# Patient Record
Sex: MALE | Race: WHITE
[De-identification: names, ages, dates, MRNs, and addresses within clinical notes are randomized per-mention and may not be internally consistent; named-entity substitution may affect disease eponyms.]

---

## 2017-09-28 ENCOUNTER — HOSPITAL ENCOUNTER (EMERGENCY)
Dept: HOSPITAL 31 - C.ER | Age: 72
Discharge: HOME | End: 2017-09-28
Payer: MEDICARE

## 2017-09-28 VITALS
DIASTOLIC BLOOD PRESSURE: 83 MMHG | SYSTOLIC BLOOD PRESSURE: 142 MMHG | RESPIRATION RATE: 18 BRPM | TEMPERATURE: 97.9 F | OXYGEN SATURATION: 99 % | HEART RATE: 88 BPM

## 2017-09-28 VITALS — BODY MASS INDEX: 27.4 KG/M2

## 2017-09-28 DIAGNOSIS — M54.30: Primary | ICD-10-CM

## 2017-09-28 NOTE — C.PDOC
History Of Present Illness


72 year old male with a Hx of sciatica presents to the ER with a complaint of 

sudden onset of back pain radiating down the bilateral thighs that began one 

hour PTA. Patient states the pain felt like cramps/spasms and he notes that he 

called EMS because he felt his legs were too stiff to ambulate. Patient reports 

his pain resolved upon arrival and is requesting to leave; denies weakness, 

numbness, dysuria, hematuria, or incontinence. 


Time Seen by Provider: 09/28/17 22:01


Chief Complaint (Nursing): Back Pain


History Per: Patient


History/Exam Limitations: no limitations


Onset/Duration Of Symptoms: Hrs, Sudden Onset


Current Symptoms Are (Timing): Gone


Quality Of Discomfort: Cramping, Other (Spasm)


Previous Symptoms: Other (Sciatica)


Associated Symptoms: None


Exacerbating Factor(s): Nothing


Recent travel outside of the [place]: No





Past Medical History


Reviewed: Historical Data, Nursing Documentation, Vital Signs


Vital Signs: 


 Last Vital Signs











Temp  97.9 F   09/28/17 21:51


 


Pulse  88   09/28/17 21:51


 


Resp  18   09/28/17 21:51


 


BP  142/83   09/28/17 21:51


 


Pulse Ox  99   09/29/17 03:07














- Medical History


PMH: Anemia, HTN





- [facility] Procedures








CYSTOMETROGRAM (07/07/15)


CYSTOSCOPY NEC (07/24/15)


DX ULTRASOUND NEC (07/07/15)


INSERT INDWELLING CATH (09/17/15)


PERCUTAN NEEDLE BIOPSY OF PROSTATE (07/07/15)


TRANSURETHRAL PROSTATECTOMY (TULIP) (07/24/15)








Family History: States: Unknown Family Hx





- Social History


Hx Alcohol Use: Yes (Occasional)


Hx Substance Use: No





- Immunization History


Hx Tetanus Toxoid Vaccination: No


Hx Influenza Vaccination: No


Hx Pneumococcal Vaccination: No





Review Of Systems


Constitutional: Negative for: Fever, Chills


Genitourinary: Negative for: Dysuria, Incontinence, Hematuria


Musculoskeletal: Positive for: Back Pain, Leg Pain


Neurological: Negative for: Weakness, Numbness





Physical Exam





- Physical Exam


Appears: Non-toxic, No Acute Distress


Skin: Normal Color, Warm, Dry


Head: Atraumatic, Normacephalic


Oral Mucosa: Moist


Back: No CVA Tenderness, No Vertebral Tenderness, No Paraspinal Tenderness


Extremity: Normal ROM (x4), No Tenderness, No Pedal Edema, No Deformity


Neurological/Psych: Oriented x3, Normal Speech, Normal Cognition, Normal Motor, 

Normal Sensation, Other (No focal deficits)


Gait: Steady





ED Course And Treatment


O2 Sat by Pulse Oximetry: 99 (Room air)


Pulse Ox Interpretation: Normal


Progress Note: Patient is resting comfortably, and is in no acute distress. 

Patient was instructed to follow up with PMD in 1-2 days for further 

evaluation. Patient requested Rx for muscle relaxer; Rx given and patient 

discharged home.





Disposition


Counseled Patient/Family Regarding: Diagnosis, Need For Followup, Rx Given





- Disposition


Referrals: 


Jaky Andres MD [Staff Provider] - 


Disposition: HOME/ ROUTINE


Disposition Time: 22:33


Condition: STABLE


Additional Instructions: 


Please follow up with PMD 





Continue Aleve as needed for pain





Take flexeril only at night





Return to ER if worse 


Prescriptions: 


Cyclobenzaprine [Cyclobenzaprine HCl] 10 mg PO HS #7 tab


Instructions:  Sciatica (ED)


Forms:  [facility] (English)


Print Language: [place] citizen





- Clinical Impression


Clinical Impression: 


 Sciatica








- Scribe Statement


The provider has reviewed the documentation as recorded by the Scribori Saab





All medical record entries made by the Tuibe were at my direction and 

personally dictated by me. I have reviewed the chart and agree that the record 

accurately reflects my personal performance of the history, physical exam, 

medical decision making, and the department course for this patient. I have 

also personally directed, reviewed, and agree with the discharge instructions 

and disposition.

## 2019-02-03 ENCOUNTER — HOSPITAL ENCOUNTER (EMERGENCY)
Dept: HOSPITAL 31 - C.ER | Age: 74
LOS: 1 days | Discharge: HOME | End: 2019-02-04
Payer: COMMERCIAL

## 2019-02-03 VITALS — BODY MASS INDEX: 27.4 KG/M2

## 2019-02-03 DIAGNOSIS — R33.9: Primary | ICD-10-CM

## 2019-02-03 LAB
BACTERIA #/AREA URNS HPF: (no result) /[HPF]
BILIRUB UR-MCNC: NEGATIVE MG/DL
GLUCOSE UR STRIP-MCNC: NORMAL MG/DL
HYALINE CASTS #/AREA URNS LPF: (no result) /LPF (ref 0–2)
LEUKOCYTE ESTERASE UR-ACNC: (no result) LEU/UL
PH UR STRIP: 5 [PH] (ref 5–8)
PROT UR STRIP-MCNC: NEGATIVE MG/DL
RBC # UR STRIP: (no result) /UL
SP GR UR STRIP: 1.01 (ref 1–1.03)
UROBILINOGEN UR-MCNC: NORMAL MG/DL (ref 0.2–1)

## 2019-02-03 NOTE — C.PDOC
History Of Present Illness


73 year old male presents to the ED c/o urinary retention. Patient reports he 

has been unable to pass urine since this morning. Patient has previous history 

os urinary retention. Patient reports he is not complaint with his flomax. 

Patient denies fever, chills, nausea, vomit, diarrhea, back pain, rash. 


Time Seen by Provider: 02/03/19 23:30


Chief Complaint (Nursing): Male Genitourinary


History Per: Patient


History/Exam Limitations: no limitations


Onset/Duration Of Symptoms: Days


Current Symptoms Are (Timing): Still Present


Quality Of Discomfort: Pressure


Associated Symptoms: Urinary Symptoms.  denies: Nausea, Vomiting, Diarrhea


Recent travel outside of the [place]: No


Additional History Per: Patient





Past Medical History


Reviewed: Historical Data, Nursing Documentation, Vital Signs


Vital Signs: 





                                Last Vital Signs











Temp  98 F   02/03/19 23:25


 


Pulse  106 H  02/03/19 23:25


 


Resp  22   02/03/19 23:25


 


BP  227/140 H  02/03/19 23:25


 


Pulse Ox  94 L  02/03/19 23:25














- Medical History


PMH: Anemia, HTN


   Denies: Chronic Kidney Disease


Surgical History: No Surg Hx





- [facility] Procedures











CYSTOMETROGRAM (07/07/15)


CYSTOSCOPY NEC (07/24/15)


DX ULTRASOUND NEC (07/07/15)


INSERT INDWELLING CATH (09/17/15)


PERCUTAN NEEDLE BIOPSY OF PROSTATE (07/07/15)


TRANSURETHRAL PROSTATECTOMY (T[place]IP) (07/24/15)








Family History: States: Unknown Family Hx





- Social History


Hx Alcohol Use: Yes (Occasional)


Hx Substance Use: No





- Immunization History


Hx Tetanus Toxoid Vaccination: No


Hx Influenza Vaccination: No


Hx Pneumococcal Vaccination: No





Review Of Systems


Constitutional: Negative for: Fever, Chills


Cardiovascular: Negative for: Chest Pain


Respiratory: Negative for: Shortness of Breath


Gastrointestinal: Positive for: Abdominal Pain.  Negative for: Nausea, Vomiting


Genitourinary: Positive for: Other (urinary retention)


Skin: Negative for: Rash


Neurological: Negative for: Weakness, Numbness





Physical Exam





- Physical Exam


Appears: Non-toxic, No Acute Distress, Other (thin  male)


Skin: Normal Color, Warm, Dry


Head: Atraumatic, Normacephalic


Eye(s): bilateral: Normal Inspection


Neck: Normal ROM, Supple


Chest: Symmetrical


Cardiovascular: Rhythm Regular


Respiratory: Normal Breath Sounds, No Rales, No Rhonchi, No Wheezing


Gastrointestinal/Abdominal: Soft, Tenderness (suprapubic fullness), No Guarding,

No Rebound


Back: No CVA Tenderness


Male Genital: Other (wetzel cath place with greater than 900 cc of urine)


Extremity: Normal ROM, No Tenderness, No Swelling


Neurological/Psych: Oriented x3, Normal Speech, Normal Cognition


Gait: Steady





ED Course And Treatment





- Laboratory Results


Lab Interpretation: Normal (ua neg.)


O2 Sat by Pulse Oximetry: 100 (On RA)


Pulse Ox Interpretation: Normal


Progress Note: wetzel for > 900 cc's.  leg bag


Reevaluation Time: 23:34


Reassessment Condition: Improved





Medical Decision Making


Medical Decision Making: 





non-compliant w Flomax


urinary retention for >900 cc's


home w leg bag


opt f/u with Dr. Giang- Urology- prior evals





Disposition


Doctor Will See Patient In The: Office


Counseled Patient/Family Regarding: Studies Performed, Diagnosis





- Disposition


Referrals: 


Mario Giang Jr., MD [Staff Provider] - 


Jaky Andres MD [Staff Provider] - 


Disposition: HOME/ ROUTINE


Disposition Time: 23:35


Condition: GOOD


Additional Instructions: 


Sigue tomando FLomax 0.4 mg diario





Llama manana para hacer gildardo con Dr. Giang- Urologo- en 7-10 epps.











Instructions:  Wetzel Catheter, Male, Urinary Retention (DC)


Forms:  [facility] (English)


Print Language: [place]





- Clinical Impression


Clinical Impression: 


 Urinary retention








- Scribe Statement


The provider has reviewed the documentation as recorded by the Scribe


Quentin Bojorquez





All medical record entries made by the Scribe were at my direction and 

personally dictated by me. I have reviewed the chart and agree that the record 

accurately reflects my personal performance of the history, physical exam, 

medical decision making, and the department course for this patient. I have also

personally directed, reviewed, and agree with the discharge instructions and 

disposition.

## 2019-02-04 VITALS
SYSTOLIC BLOOD PRESSURE: 159 MMHG | TEMPERATURE: 98.5 F | RESPIRATION RATE: 16 BRPM | HEART RATE: 85 BPM | DIASTOLIC BLOOD PRESSURE: 81 MMHG

## 2019-02-04 VITALS — OXYGEN SATURATION: 100 %

## 2019-02-06 ENCOUNTER — HOSPITAL ENCOUNTER (OUTPATIENT)
Dept: HOSPITAL 31 - C.RADH | Age: 74
End: 2019-02-06
Payer: MEDICARE

## 2019-02-06 DIAGNOSIS — Z01.818: Primary | ICD-10-CM

## 2019-02-18 ENCOUNTER — HOSPITAL ENCOUNTER (OUTPATIENT)
Dept: HOSPITAL 31 - C.SDS | Age: 74
Discharge: HOME | End: 2019-02-18
Attending: UROLOGY
Payer: MEDICARE

## 2019-02-18 VITALS — TEMPERATURE: 97.8 F

## 2019-02-18 VITALS — SYSTOLIC BLOOD PRESSURE: 129 MMHG | HEART RATE: 59 BPM | DIASTOLIC BLOOD PRESSURE: 67 MMHG

## 2019-02-18 VITALS — OXYGEN SATURATION: 100 %

## 2019-02-18 VITALS — BODY MASS INDEX: 27.4 KG/M2

## 2019-02-18 VITALS — RESPIRATION RATE: 18 BRPM

## 2019-02-18 DIAGNOSIS — R33.9: Primary | ICD-10-CM

## 2019-02-18 DIAGNOSIS — N40.1: ICD-10-CM

## 2019-02-18 PROCEDURE — 52648 LASER SURGERY OF PROSTATE: CPT

## 2019-02-18 NOTE — PCM.SURG1
Surgeon's Initial Post Op Note





- Surgeon's Notes


Surgeon: Padmaja


Assistant: rj


Type of Anesthesia: General LMA


Anesthesia Administered By: staff


Pre-Operative Diagnosis: BPH/Coates/urinary retention


Operative Findings: same


Post-Operative Diagnosis: same


Operation Performed: [place]


Specimen/Specimens Removed: na


Estimated Blood Loss: EBL {In ML}: 0


Blood Products Given: N/A


Drains Used: No Drains


Post-Op Condition: Good


Date of Surgery/Procedure: 02/18/19


Time of Surgery/Procedure: 14:57

## 2019-02-19 NOTE — OP
PROCEDURE DATE:  02/18/2019



PREOPERATIVE DIAGNOSES:  Benign prostatic hypertrophy and acute urinary

retention.



POSTOPERATIVE DIAGNOSES:  Benign prostatic hypertrophy and acute urinary

retention.



PROCEDURE:  GreenLight laser prostatectomy.



DESCRIPTION OF PROCEDURE:  The patient was asked to sign a detailed

informed consent after a full consultation with the patient's family and a

full explanation of the risks and benefits of this procedure and its

limitations.  The family and the patient agreed, and the patient signed the

appropriate consent.  He was brought into the room and prophylactic

antibiotics were given.  A time-out was taken according to the rules and

regulations of [facility].  The patient was cystoscoped with laser

cystoscope after careful draping and prepping, and the cystoscopic findings

showed trilobar hypertrophy of the prostate with significant outlet

obstruction and compensatory trabeculation of the bladder.  The laser

element was inserted and the vaporization of the prostate was begun at 11

o'clock and carried down to 6 o'clock, vaporizing all tissue from just

distal to the bladder neck to just proximal to the verumontanum.  In the

left lobe, the base tissue and the roof tissue were vaporized in a similar

fashion.  No injury occurred to the external sphincter, ureteral orifices

or verumontanum.  Once meticulous hemostasis was achieved, the patient was

catheterized with a #20 two-way 5 mL catheter and the irrigant fluid was

drained.  The patient tolerated this procedure well, and he was sent to the

recovery area in good condition.





__________________________________________

Mario Giang MD





DD:  02/18/2019 15:25:33

DT:  02/18/2019 20:51:45

Job # [id number]